# Patient Record
Sex: FEMALE | Race: WHITE
[De-identification: names, ages, dates, MRNs, and addresses within clinical notes are randomized per-mention and may not be internally consistent; named-entity substitution may affect disease eponyms.]

---

## 2018-10-10 ENCOUNTER — HOSPITAL ENCOUNTER (INPATIENT)
Dept: HOSPITAL 89 - OR | Age: 38
LOS: 4 days | Discharge: HOME | DRG: 340 | End: 2018-10-14
Attending: SURGERY | Admitting: SURGERY
Payer: COMMERCIAL

## 2018-10-10 ENCOUNTER — HOSPITAL ENCOUNTER (OUTPATIENT)
Dept: HOSPITAL 89 - CT | Age: 38
End: 2018-10-10
Attending: PHYSICIAN ASSISTANT
Payer: COMMERCIAL

## 2018-10-10 VITALS — DIASTOLIC BLOOD PRESSURE: 76 MMHG | SYSTOLIC BLOOD PRESSURE: 112 MMHG

## 2018-10-10 VITALS — SYSTOLIC BLOOD PRESSURE: 149 MMHG | DIASTOLIC BLOOD PRESSURE: 78 MMHG

## 2018-10-10 VITALS — DIASTOLIC BLOOD PRESSURE: 78 MMHG | SYSTOLIC BLOOD PRESSURE: 121 MMHG

## 2018-10-10 VITALS — DIASTOLIC BLOOD PRESSURE: 76 MMHG | SYSTOLIC BLOOD PRESSURE: 118 MMHG

## 2018-10-10 VITALS — DIASTOLIC BLOOD PRESSURE: 76 MMHG | SYSTOLIC BLOOD PRESSURE: 121 MMHG

## 2018-10-10 VITALS
WEIGHT: 243 LBS | WEIGHT: 243 LBS | HEIGHT: 69 IN | HEIGHT: 69 IN | BODY MASS INDEX: 35.99 KG/M2 | BODY MASS INDEX: 35.99 KG/M2

## 2018-10-10 VITALS — SYSTOLIC BLOOD PRESSURE: 123 MMHG | DIASTOLIC BLOOD PRESSURE: 76 MMHG

## 2018-10-10 VITALS — DIASTOLIC BLOOD PRESSURE: 74 MMHG | SYSTOLIC BLOOD PRESSURE: 126 MMHG

## 2018-10-10 VITALS — DIASTOLIC BLOOD PRESSURE: 64 MMHG | SYSTOLIC BLOOD PRESSURE: 114 MMHG

## 2018-10-10 VITALS — SYSTOLIC BLOOD PRESSURE: 121 MMHG | DIASTOLIC BLOOD PRESSURE: 72 MMHG

## 2018-10-10 VITALS — DIASTOLIC BLOOD PRESSURE: 73 MMHG | SYSTOLIC BLOOD PRESSURE: 121 MMHG

## 2018-10-10 DIAGNOSIS — E66.9: ICD-10-CM

## 2018-10-10 DIAGNOSIS — N83.201: ICD-10-CM

## 2018-10-10 DIAGNOSIS — N83.202: ICD-10-CM

## 2018-10-10 DIAGNOSIS — K35.80: Primary | ICD-10-CM

## 2018-10-10 DIAGNOSIS — K57.30: ICD-10-CM

## 2018-10-10 DIAGNOSIS — K35.32: Primary | ICD-10-CM

## 2018-10-10 DIAGNOSIS — F41.8: ICD-10-CM

## 2018-10-10 PROCEDURE — 74177 CT ABD & PELVIS W/CONTRAST: CPT

## 2018-10-10 PROCEDURE — 84295 ASSAY OF SERUM SODIUM: CPT

## 2018-10-10 PROCEDURE — 82565 ASSAY OF CREATININE: CPT

## 2018-10-10 PROCEDURE — 81025 URINE PREGNANCY TEST: CPT

## 2018-10-10 PROCEDURE — 0DTJ0ZZ RESECTION OF APPENDIX, OPEN APPROACH: ICD-10-PCS | Performed by: SURGERY

## 2018-10-10 PROCEDURE — 86140 C-REACTIVE PROTEIN: CPT

## 2018-10-10 PROCEDURE — 82947 ASSAY GLUCOSE BLOOD QUANT: CPT

## 2018-10-10 PROCEDURE — 82435 ASSAY OF BLOOD CHLORIDE: CPT

## 2018-10-10 PROCEDURE — 88304 TISSUE EXAM BY PATHOLOGIST: CPT

## 2018-10-10 PROCEDURE — 85025 COMPLETE CBC W/AUTO DIFF WBC: CPT

## 2018-10-10 PROCEDURE — 82374 ASSAY BLOOD CARBON DIOXIDE: CPT

## 2018-10-10 PROCEDURE — 84132 ASSAY OF SERUM POTASSIUM: CPT

## 2018-10-10 PROCEDURE — 84520 ASSAY OF UREA NITROGEN: CPT

## 2018-10-10 PROCEDURE — 36415 COLL VENOUS BLD VENIPUNCTURE: CPT

## 2018-10-10 PROCEDURE — 82310 ASSAY OF CALCIUM: CPT

## 2018-10-10 RX ADMIN — DOCUSATE SODIUM SCH MG: 100 CAPSULE, LIQUID FILLED ORAL at 20:57

## 2018-10-10 RX ADMIN — HYDROCODONE BITARTRATE AND ACETAMINOPHEN PRN EACH: 5; 325 TABLET ORAL at 21:05

## 2018-10-10 RX ADMIN — DEXTROSE MONOHYDRATE, SODIUM CHLORIDE, AND POTASSIUM CHLORIDE PRN MLS/HR: 50; 4.5; 1.49 INJECTION, SOLUTION INTRAVENOUS at 20:59

## 2018-10-10 RX ADMIN — PIPERACILLIN AND TAZOBACTAM SCH MLS/HR: 3; .375 INJECTION, POWDER, LYOPHILIZED, FOR SOLUTION INTRAVENOUS; PARENTERAL at 23:16

## 2018-10-10 NOTE — POST OPERATIVE PROGRESS NOTE
Post Operative Progress Note


Date:  Oct 10, 2018


Time:  19:02


Surgeon:  


Ethan Pritchett MD


Assistant:  


none


Anesthesia:  


Dr Anahi HERNANDEZ


Pre-Op Diagnosis:  


acute appendicitis


Post-Op Diagnosis:  


same, contained perforation


Findings:  


gross fecal contamination RLQ


Procedure(s):  


laparoscopic appendectomy, full note dictated #[id number]


Specimen Removed:(May be N/A):  


appendix


Complications:  


none


Total Tourniquet Time:  


NA


Splint:  


NA


Fluids:  


900 ml


Estimated Blood Loss:  


< 5 ml


Date OP Note Dictated:  Oct 10, 2018


Time OP Note Dictated:  18:58











ETHAN PRITCHETT MD               Oct 10, 2018 19:06

## 2018-10-10 NOTE — GEN SURGERY HISTORY & PHYSICAL
History of Present Illness


Chief Complaint


RLQ abdominal pain


History of Present Illness


37 yo female with acute onset severe, constant RLQ abdominal pain.  + NV.  Seen 


by PCP today, with elevated WBC, sent for CT scan which was consistent with 


acute appendicitis.  No prior similar s/s, no hx IBD.





History


Unable To Obtain Past Medical:  PCP notes reviewed





Review of Systems


Constitutional:  Fever


Gastrointestinal:  Other (see HPI)


Psychiatric:  Anxiety





Exam


General Appearance:  Alert, Awake, No Acute Distress, Afebrile


Neuro:  No Gross deficits


Cardiovascular:  Normal Rhythm & Peripheral Pulses


Respiratory:  No Respiratory Distress, Clear to Auscultation


GI:  Other (soft, obese, tender RLQ with + peritoneal s/s)


Musculoskeletal:  No Weakness/Pain


Integumentary:  Skin Intact without Lesion / Mass


Psych:  Alert & Oriented X3, Appropriate Mood & Affect





Medical Decision Making


EKG / Imaging


Monitor Interpretation:  Normal Sinus Rhythm





Assessment and Plan


Problems:  


(1) Appendicitis


Status:  Acute


Assessment & Plan:  Pt recommended to undergo laparoscopic appendectomy. Proc, 


risks, benefits discussed with pt.  She understands risk of bleeding, leak, 


abscess, damage to viscera, need for secondary intervention.  Informed consent 


signed.





Time Spent:  > 30 min





Venous Thromboembolism


VTE Risk


Physician Assess for VTE Risk:  Yes


Patient's VTE Risk:  Low





VTE Diagnostic Test


2 Days Prior to Admit:  No





Antithrombotics


Is Pt On Any Antithrombotics?:  No





Prophylaxis Tx Contraindicated


Pharmacological Contraindicati:  Pt at Low Risk for VTE


Mechanical Contraindications:  Pt at Low Risk for VTE











ETHAN RANGEL MD               Oct 10, 2018 17:22

## 2018-10-10 NOTE — RADIOLOGY IMAGING REPORT
FACILITY: [facility] [place] 

 

PATIENT NAME: Nina Chiang

: 1980

MR: [id number]

V: [id number]

EXAM DATE: 

ORDERING PHYSICIAN: JANA PAVON

TECHNOLOGIST: 

 

Location: [facility] [place]

Patient: Nina Chiang

: 1980

MRN: [id number]

Visit/Account:[id number]

Date of Sevice: 10/10/2018

 

ACCESSION #: [id number]

 

ABDOMEN/PELVIS WITH CONTRAST

 

HISTORY:  Right lower quadrant pain

 

TECHNIQUE: Following administration of IV contrast contiguous axial images acquired through the abdom
en/pelvis.  Coronal and sagittal reformatting also performed. Dose Lowering Technique

 

One of the following dose optimization techniques was utilized in the performance of this exam: Autom
ated exposure control; adjustment of the mA and/or kV to the patient's size; or use of an iterative  
reconstruction technique.  Specific details can be referenced in the facility's radiology CT exam ope
rational policy.

 

 

 

CONTRAST:  75 mL Isovue-370

 

COMPARISON:  None.

 

FINDINGS:

 

Visualized lung bases:  Negative.

 

Hepatobiliary:  Negative.

 

Spleen:  Negative.

 

Adrenals:  Negative.

 

Pancreas:  Negative.

 

Kidneys ureters or bladder: Negative.

 

Genitalia:  Bilateral ovarian cysts.  The largest on the right measures 3 cm in diameter the largest 
on the left measures 1.9 cm in diameter

 

GI:  The appendix is very dilated, fluid-filled and measuring up to 1.5 cm in diameter with  enhancem
ent of the appendiceal wall.  There are marked infiltrative changes seen in the periappendiceal fat a
nd adjacent free fluid small amount extends into the pelvis.  A periappendiceal abscess is not apprec
iated.  There is however thickening of the wall of the adjacent cecum.

 

 Incidentally noted is mild diverticulosis of the sigmoid colon although no CT evidence of acute dive
rticulitis

 

Vessels/spaces/nodes:  There are several small mesenteric lymph nodes in the right lower quadrant

 

Bones/soft tissues:  Incidentally noted are moderate spondylotic changes at L4-5 and L5-S1

 

Additional findings:  None pertinent.

 

IMPRESSION:

 

Findings are consistent with acute appendicitis with a very dilated fluid-filled appendix measuring u
p to 1.5 cm in diameter with enhancement of the appendiceal wall.  There are marked infiltrative pretty
ges seen in the periappendiceal fat with adjacent free fluid extending into the pelvis.  Is also thic
kening of the wall of the adjacent cecum although a discrete periappendiceal abscess is not seen.

 

 

Incidental note of mild diverticulosis of the sigmoid colon

 

Bilateral ovarian cysts measuring 3 cm on the right and 1.9 cm on the left

 

 

 

Results were called to JANA PAVON at 10/10/2018 4:40 PM.

 

Report Dictated By: Aida Jenkins MD at 10/10/2018 4:41 PM

 

Report E-Signed By: Aida Jenkins MD  at 10/10/2018 4:48 PM

 

WSN:[id number]

## 2018-10-11 VITALS — SYSTOLIC BLOOD PRESSURE: 117 MMHG | DIASTOLIC BLOOD PRESSURE: 72 MMHG

## 2018-10-11 VITALS — DIASTOLIC BLOOD PRESSURE: 66 MMHG | SYSTOLIC BLOOD PRESSURE: 110 MMHG

## 2018-10-11 VITALS — DIASTOLIC BLOOD PRESSURE: 65 MMHG | SYSTOLIC BLOOD PRESSURE: 114 MMHG

## 2018-10-11 VITALS — SYSTOLIC BLOOD PRESSURE: 96 MMHG | DIASTOLIC BLOOD PRESSURE: 54 MMHG

## 2018-10-11 VITALS — DIASTOLIC BLOOD PRESSURE: 65 MMHG | SYSTOLIC BLOOD PRESSURE: 108 MMHG

## 2018-10-11 VITALS — DIASTOLIC BLOOD PRESSURE: 64 MMHG | SYSTOLIC BLOOD PRESSURE: 118 MMHG

## 2018-10-11 VITALS — DIASTOLIC BLOOD PRESSURE: 64 MMHG | SYSTOLIC BLOOD PRESSURE: 111 MMHG

## 2018-10-11 VITALS — DIASTOLIC BLOOD PRESSURE: 51 MMHG | SYSTOLIC BLOOD PRESSURE: 97 MMHG

## 2018-10-11 LAB — PLATELET COUNT, AUTOMATED: 217 K/UL (ref 150–450)

## 2018-10-11 RX ADMIN — DEXTROSE MONOHYDRATE, SODIUM CHLORIDE, AND POTASSIUM CHLORIDE PRN MLS/HR: 50; 4.5; 1.49 INJECTION, SOLUTION INTRAVENOUS at 06:34

## 2018-10-11 RX ADMIN — PIPERACILLIN AND TAZOBACTAM SCH MLS/HR: 3; .375 INJECTION, POWDER, LYOPHILIZED, FOR SOLUTION INTRAVENOUS; PARENTERAL at 23:39

## 2018-10-11 RX ADMIN — PIPERACILLIN AND TAZOBACTAM SCH MLS/HR: 3; .375 INJECTION, POWDER, LYOPHILIZED, FOR SOLUTION INTRAVENOUS; PARENTERAL at 13:18

## 2018-10-11 RX ADMIN — HYDROCODONE BITARTRATE AND ACETAMINOPHEN PRN EACH: 5; 325 TABLET ORAL at 18:08

## 2018-10-11 RX ADMIN — PIPERACILLIN AND TAZOBACTAM SCH MLS/HR: 3; .375 INJECTION, POWDER, LYOPHILIZED, FOR SOLUTION INTRAVENOUS; PARENTERAL at 05:30

## 2018-10-11 RX ADMIN — HYDROCODONE BITARTRATE AND ACETAMINOPHEN PRN EACH: 5; 325 TABLET ORAL at 22:23

## 2018-10-11 RX ADMIN — PIPERACILLIN AND TAZOBACTAM SCH MLS/HR: 3; .375 INJECTION, POWDER, LYOPHILIZED, FOR SOLUTION INTRAVENOUS; PARENTERAL at 18:08

## 2018-10-11 RX ADMIN — DOCUSATE SODIUM SCH MG: 100 CAPSULE, LIQUID FILLED ORAL at 21:30

## 2018-10-11 RX ADMIN — HYDROCODONE BITARTRATE AND ACETAMINOPHEN PRN EACH: 5; 325 TABLET ORAL at 01:16

## 2018-10-11 RX ADMIN — HYDROCODONE BITARTRATE AND ACETAMINOPHEN PRN EACH: 5; 325 TABLET ORAL at 08:07

## 2018-10-11 RX ADMIN — ESCITALOPRAM OXALATE SCH MG: 10 TABLET, FILM COATED ORAL at 21:26

## 2018-10-11 RX ADMIN — DOCUSATE SODIUM SCH MG: 100 CAPSULE, LIQUID FILLED ORAL at 08:07

## 2018-10-11 RX ADMIN — ENOXAPARIN SODIUM SCH MG: 100 INJECTION SUBCUTANEOUS at 21:27

## 2018-10-11 NOTE — GENERAL SURGERY PROGRESS NOTE
Subjective


Progress Notes


Subjective


c/o incisional pain, no NV





Physical Exam





Vital Signs








  Date Time  Temp Pulse Resp B/P (MAP) Pulse Ox O2 Delivery O2 Flow Rate FiO2


 


10/11/18 08:03 98.0 63 16 117/72 (87) 93 Room Air  


 


10/11/18 05:36       1.0 














Intake and Output 


 


 10/11/18





 06:59


 


Intake Total 2300 ml


 


Output Total 210 ml


 


Balance 2090 ml


 


 


 


Intake IV Total 2300 ml


 


Output Drainage Total 120 ml


 


Other 90 ml








General Appearance:  Alert, Awake, No Acute Distress, Afebrile


Neuro:  No Gross deficits


Cardiovascular:  Normal Rhythm & Peripheral Pulses, Regular Rate and Rhythm


Respiratory:  No Respiratory Distress, Clear to Auscultation


GI:  Soft and Non-Tender, Other (MIGDALIA serosang)


Musculoskeletal:  No Weakness/Pain


Integumentary:  Skin Intact without Lesion / Mass


Psych:  Alert & Oriented X3, Appropriate Mood & Affect


Result Diagram:  


10/11/18 0533                                                                   


            10/11/18 0533





Monitor Interpretation:  Normal Sinus Rhythm





Assessment and Plan


Problems:  


(1) Appendicitis


Status:  Acute


Assessment & Plan:  Pt recommended to undergo laparoscopic appendectomy. Proc, 


risks, benefits discussed with pt.  She understands risk of bleeding, leak, 


abscess, damage to viscera, need for secondary intervention.  Informed consent 


signed.





10/11/2018: stable progress, cont Zosyn.  Cont full liquids. Pulm toilet and 


mobilize OOB.  Cont MIGDALIA.





Time Spent:  > 30 min





Exam


Sepsis Risk:  No Definite Risk











ETHAN RANGEL MD               Oct 11, 2018 08:12

## 2018-10-11 NOTE — OPERATIVE REPORT 1
EVENT DATE:  October 10, 2018 

SURGEON:  Jayla Pritchett MD

ANESTHESIOLOGIST:  Krishna Christian MD 

ANESTHESIA:  General endotracheal anesthesia.





PREOPERATIVE DIAGNOSIS  

Acute appendicitis. 



POSTOPERATIVE DIAGNOSIS 

Acute appendicitis, perforated appendicitis.



PROCEDURE PERFORMED 

Laparoscopic appendectomy.



WOUND CLASS

4 



INDICATIONS FOR OPERATION 

This patient is a 38-year-old female with peritoneal signs localized in the 

right lower quadrant with markedly abnormal appendix on CT scan.  



FINDINGS AT TIME OF OPERATION

The patient had acute perforated appendicitis with gross fecal contamination in 

the right lower quadrant.  This was completely debrided and irrigated until the 

effluent was clear.  



DETAILS OF THE OPERATION

On October 10, 2018, patient was brought to the operating room and placed in the

supine position.  Appropriate lines and monitors were placed.  Patient was 

induced and intubated under general anesthesia without difficulty.  She was then

prepped and draped in the usual sterile manner.  Pneumoperitoneum was 

established at the infraumbilical position using standard open technique.  Skin,

subcutaneous, and fascial tissues were opened under direct vision using sharp 

dissection as well as electrocautery.  The peritoneal cavity was entered, and 

the Karina trocar was inserted and held in place with two stay sutures of #1 

Vicryl.  Under direct vision, two additional trocars were placed, a 5 mm along 

the lower midline and another 12 mm in the right lower quadrant.  The turbid 

fluid in the pelvis was irrigated free, and the appendix was identified in the 

right lower quadrant.  The findings are as stated above.  The mesoappendix was 

opened at the base of the appendix, and the mesoappendix was then divided with 

the laparoscopic stapler.  The appendix was then divided with the bowel load of 

the laparoscopic stapler. The appendix was placed in a pouch and removed from 

the abdominal cavity.  The operative field was hemostatic.  This was then 

irrigated copiously with 1 L of sterile saline, and no further gross fibrinous 

or fecal contamination was identified.  A 19 mm Karan-Leslie drain was then 

placed through the lower trocar site and along the operative field.  The fascia 

at the right lower quadrant was closed with the Haris-Dean device.  All 

trocars were removed under direct vision, and the sites were hemostatic.  

Pneumoperitoneum was evacuated.  Fascia closed at the infraumbilical level with 

the two stay sutures.  Wounds were infiltrated with a total of 40 mL of 0.25% 

Marcaine without epinephrine.  Wounds were then dressed with Mastisol, Steri-

Strips, and dry, sterile gauze.  Patient was then extubated and taken to 

recovery room in stable condition.  She tolerated the procedure well.  Estimated

blood loss was minimal.  Final sponge and needle counts were correct times two. 

MTDD

## 2018-10-12 VITALS — SYSTOLIC BLOOD PRESSURE: 102 MMHG | DIASTOLIC BLOOD PRESSURE: 63 MMHG

## 2018-10-12 VITALS — SYSTOLIC BLOOD PRESSURE: 102 MMHG | DIASTOLIC BLOOD PRESSURE: 60 MMHG

## 2018-10-12 VITALS — SYSTOLIC BLOOD PRESSURE: 112 MMHG | DIASTOLIC BLOOD PRESSURE: 67 MMHG

## 2018-10-12 VITALS — DIASTOLIC BLOOD PRESSURE: 54 MMHG | SYSTOLIC BLOOD PRESSURE: 107 MMHG

## 2018-10-12 VITALS — DIASTOLIC BLOOD PRESSURE: 63 MMHG | SYSTOLIC BLOOD PRESSURE: 107 MMHG

## 2018-10-12 LAB — PLATELET COUNT, AUTOMATED: 196 K/UL (ref 150–450)

## 2018-10-12 RX ADMIN — PIPERACILLIN AND TAZOBACTAM SCH MLS/HR: 3; .375 INJECTION, POWDER, LYOPHILIZED, FOR SOLUTION INTRAVENOUS; PARENTERAL at 05:39

## 2018-10-12 RX ADMIN — ESCITALOPRAM OXALATE SCH MG: 10 TABLET, FILM COATED ORAL at 20:29

## 2018-10-12 RX ADMIN — ENOXAPARIN SODIUM SCH MG: 100 INJECTION SUBCUTANEOUS at 20:29

## 2018-10-12 RX ADMIN — PIPERACILLIN AND TAZOBACTAM SCH MLS/HR: 3; .375 INJECTION, POWDER, LYOPHILIZED, FOR SOLUTION INTRAVENOUS; PARENTERAL at 12:19

## 2018-10-12 RX ADMIN — PIPERACILLIN AND TAZOBACTAM SCH MLS/HR: 3; .375 INJECTION, POWDER, LYOPHILIZED, FOR SOLUTION INTRAVENOUS; PARENTERAL at 18:11

## 2018-10-12 RX ADMIN — DOCUSATE SODIUM SCH MG: 100 CAPSULE, LIQUID FILLED ORAL at 20:29

## 2018-10-12 RX ADMIN — HYDROCODONE BITARTRATE AND ACETAMINOPHEN PRN EACH: 5; 325 TABLET ORAL at 10:24

## 2018-10-12 RX ADMIN — HYDROCODONE BITARTRATE AND ACETAMINOPHEN PRN EACH: 5; 325 TABLET ORAL at 05:39

## 2018-10-12 RX ADMIN — DOCUSATE SODIUM SCH MG: 100 CAPSULE, LIQUID FILLED ORAL at 09:14

## 2018-10-12 NOTE — GENERAL SURGERY PROGRESS NOTE
Subjective


Progress Notes


Subjective


feels a lil better, up in halls without difficulty, appetite still diminished.  


No NV, + flatus





Physical Exam





Vital Signs








  Date Time  Temp Pulse Resp B/P (MAP) Pulse Ox O2 Delivery O2 Flow Rate FiO2


 


10/12/18 05:42 98.0 74 18 107/63 (78) 95 Nasal Cannula 1.0 














Intake and Output 


 


 10/12/18





 07:00


 


Intake Total 2574 ml


 


Output Total 90 ml


 


Balance 2484 ml


 


 


 


Intake Oral 1270 ml


 


IV Total 1304 ml


 


Output Drainage Total 90 ml


 


# Voids 3








General Appearance:  Alert, Awake, No Acute Distress, Afebrile


Neuro:  No Gross deficits


Cardiovascular:  Normal Rhythm & Peripheral Pulses


Respiratory:  No Respiratory Distress, Clear to Auscultation


GI:  Soft and Non-Tender, Other (incisions CDI, MIGDALIA serosang)


Musculoskeletal:  No Weakness/Pain


Extremities:  Soft and Non Tender, Warm, Pulses, Perfused, Other (no CCE)


Integumentary:  Skin Intact without Lesion / Mass


Psych:  Alert & Oriented X3, Appropriate Mood & Affect


Result Diagram:  


10/12/18 0537                                                                   


            10/11/18 0533





Monitor Interpretation:  Normal Sinus Rhythm





Assessment and Plan


Problems:  


(1) Appendicitis


Status:  Acute


Assessment & Plan:  Pt recommended to undergo laparoscopic appendectomy. Proc, 


risks, benefits discussed with pt.  She understands risk of bleeding, leak, 


abscess, damage to viscera, need for secondary intervention.  Informed consent 


signed.





10/11/2018: POD#1 stable progress, cont Zosyn.  Cont full liquids. Pulm toilet 


and mobilize OOB.  Cont MIGDALIA.





10/12/2018: POD#2  Stable.Cont Zosyn.  Adv to full liq slow as manoj.  DC MIGDALIA given


low output.  WBC improved.





Time Spent:  > 30 min





Exam


Sepsis Risk:  No Definite Risk





Problem Qualifiers





(1) Appendicitis:  


Appendicitis type:  acute appendicitis  Acute appendicitis type:  with localized


peritonitis  Appendicitis perforation presence:  with perforation  Appendicitis 


abscess presence:  without abscess








ETHAN RANGEL MD               Oct 12, 2018 07:41

## 2018-10-13 VITALS — DIASTOLIC BLOOD PRESSURE: 70 MMHG | SYSTOLIC BLOOD PRESSURE: 142 MMHG

## 2018-10-13 VITALS — SYSTOLIC BLOOD PRESSURE: 135 MMHG | DIASTOLIC BLOOD PRESSURE: 77 MMHG

## 2018-10-13 VITALS — DIASTOLIC BLOOD PRESSURE: 76 MMHG | SYSTOLIC BLOOD PRESSURE: 120 MMHG

## 2018-10-13 VITALS — SYSTOLIC BLOOD PRESSURE: 125 MMHG | DIASTOLIC BLOOD PRESSURE: 77 MMHG

## 2018-10-13 VITALS — DIASTOLIC BLOOD PRESSURE: 82 MMHG | SYSTOLIC BLOOD PRESSURE: 124 MMHG

## 2018-10-13 VITALS — DIASTOLIC BLOOD PRESSURE: 69 MMHG | SYSTOLIC BLOOD PRESSURE: 123 MMHG

## 2018-10-13 VITALS — DIASTOLIC BLOOD PRESSURE: 67 MMHG | SYSTOLIC BLOOD PRESSURE: 123 MMHG

## 2018-10-13 VITALS — SYSTOLIC BLOOD PRESSURE: 107 MMHG | DIASTOLIC BLOOD PRESSURE: 60 MMHG

## 2018-10-13 LAB — PLATELET COUNT, AUTOMATED: 208 K/UL (ref 150–450)

## 2018-10-13 RX ADMIN — PIPERACILLIN AND TAZOBACTAM SCH MLS/HR: 3; .375 INJECTION, POWDER, LYOPHILIZED, FOR SOLUTION INTRAVENOUS; PARENTERAL at 06:01

## 2018-10-13 RX ADMIN — ESCITALOPRAM OXALATE SCH MG: 10 TABLET, FILM COATED ORAL at 20:22

## 2018-10-13 RX ADMIN — DOCUSATE SODIUM SCH MG: 100 CAPSULE, LIQUID FILLED ORAL at 20:22

## 2018-10-13 RX ADMIN — DOCUSATE SODIUM SCH MG: 100 CAPSULE, LIQUID FILLED ORAL at 09:15

## 2018-10-13 RX ADMIN — AMOXICILLIN AND CLAVULANATE POTASSIUM SCH MG: 875; 125 TABLET, FILM COATED ORAL at 17:01

## 2018-10-13 RX ADMIN — ENOXAPARIN SODIUM SCH MG: 100 INJECTION SUBCUTANEOUS at 20:22

## 2018-10-13 RX ADMIN — PIPERACILLIN AND TAZOBACTAM SCH MLS/HR: 3; .375 INJECTION, POWDER, LYOPHILIZED, FOR SOLUTION INTRAVENOUS; PARENTERAL at 00:18

## 2018-10-13 RX ADMIN — AMOXICILLIN AND CLAVULANATE POTASSIUM SCH MG: 875; 125 TABLET, FILM COATED ORAL at 10:06

## 2018-10-13 NOTE — ANTIMICROBIAL STEWARDSHIP
Antimicrobial Time Out


Antimicrobial Stewardship


MD Service:  Other (GENERAL SURGERY)


Indications:  Other (POST OP PROPHYLAXIS / APPENDICITIS)


Antimicrobial Used


AUGMENTIN 875MG PO BID


Culture Results:  N/A





Eligible for PO Conversion


Eligable for PO Conversion:  Yes (CONVERTED TO PO TODAY 10/13)





Reviewed with Provider


Reviewed w/ Provider on Rounds:  No











REGINALDO LANGLEY                  Oct 13, 2018 15:54

## 2018-10-13 NOTE — GENERAL SURGERY PROGRESS NOTE
Subjective


Patient Complains of:


Gastrointestinal:  Other (gurgling and rumbling in stomach)


Musculoskeletal:  Pain (mild pain at incision sites when moving)





Physical Exam





Vital Signs








  Date Time  Temp Pulse Resp B/P (MAP) Pulse Ox O2 Delivery O2 Flow Rate FiO2


 


10/13/18 08:14 98.9 77 16 124/82 (96) 94 Nasal Cannula 0.5 














Intake and Output 


 


 10/13/18





 07:00


 


Intake Total 970 ml


 


Output Total 30 ml


 


Balance 940 ml


 


 


 


Intake Oral 120 ml


 


IV Total 850 ml


 


Output Drainage Total 30 ml


 


# Voids 2








General Appearance:  Alert, Awake


Cardiovascular:  Regular Rate and Rhythm


Respiratory:  Clear to Auscultation, Other (requiring supplemental oxygen when 


dozes off)


GI:  Soft and Non-Tender, Other (except at incisions, active bowel sounds, 


flatus but no bowel movement)


Extremities:  Soft and Non Tender, Warm


Result Diagram:  


10/13/18 0608                                                                   


            10/11/18 0533





Monitor Interpretation:  Normal Sinus Rhythm





Assessment and Plan


Problems:  


(1) Appendicitis


Status:  Acute


Assessment & Plan:  Pt recommended to undergo laparoscopic appendectomy. Proc, 


risks, benefits discussed with pt.  She understands risk of bleeding, leak, 


abscess, damage to viscera, need for secondary intervention.  Informed consent 


signed.





10/11/2018: POD#1 stable progress, cont Zosyn.  Cont full liquids. Pulm toilet 


and mobilize OOB.  Cont MIGDALIA.





10/12/2018: POD#2  Stable.Cont Zosyn.  Adv to full liq slow as manoj.  DC MIGDALIA given


low output.  WBC improved.





10/13/2018  9am:  POD#3  Continue to look good.  WBCs normal and afebrile.  


Convert to oral antibiotics and probably home tomorrow with 7 day (total) course


of antibiotics.  Pathology noted.  Also note elevated glucose.








Exam


Sepsis Risk:  No Definite Risk





Problem Qualifiers





(1) Appendicitis:  


Appendicitis type:  acute appendicitis  Acute appendicitis type:  with localized


peritonitis  Appendicitis perforation presence:  with perforation  Appendicitis 


abscess presence:  without abscess








LESTER SAENZ MD             Oct 13, 2018 09:01

## 2018-10-14 VITALS — SYSTOLIC BLOOD PRESSURE: 113 MMHG | DIASTOLIC BLOOD PRESSURE: 63 MMHG

## 2018-10-14 VITALS — SYSTOLIC BLOOD PRESSURE: 107 MMHG | DIASTOLIC BLOOD PRESSURE: 71 MMHG

## 2018-10-14 RX ADMIN — AMOXICILLIN AND CLAVULANATE POTASSIUM SCH MG: 875; 125 TABLET, FILM COATED ORAL at 08:11

## 2018-10-14 RX ADMIN — DOCUSATE SODIUM SCH MG: 100 CAPSULE, LIQUID FILLED ORAL at 08:12

## 2018-10-14 NOTE — GENERAL SURGERY PROGRESS NOTE
Subjective


Progress Notes


Subjective


"I feel MUCH better this morning!"





Physical Exam





Vital Signs








  Date Time  Temp Pulse Resp B/P (MAP) Pulse Ox O2 Delivery O2 Flow Rate FiO2


 


10/14/18 07:28 98.3 66 14 113/63 (80) 96 Nasal Cannula 0.5 














Intake and Output 


 


 10/14/18





 07:00


 


Intake Total 618 ml


 


Output Total 30 ml


 


Balance 588 ml


 


 


 


Intake Oral 618 ml


 


Output Drainage Total 30 ml


 


# Voids 2








General Appearance:  Alert, Awake, No Acute Distress


Cardiovascular:  Regular Rate and Rhythm


Respiratory:  Clear to Auscultation


GI:  Soft and Non-Tender, Other (incisions examined and are clean, dry and 


without cellulitis.)


Extremities:  Soft and Non Tender, Warm, Other (without edema)


Result Diagram:  


10/13/18 0608                                                                   


            10/11/18 0533





Monitor Interpretation:  Normal Sinus Rhythm





Assessment and Plan


Problems:  


(1) Appendicitis


Status:  Acute


Assessment & Plan:  Pt recommended to undergo laparoscopic appendectomy. Proc, 


risks, benefits discussed with pt.  She understands risk of bleeding, leak, 


abscess, damage to viscera, need for secondary intervention.  Informed consent 


signed.





10/11/2018: POD#1 stable progress, cont Zosyn.  Cont full liquids. Pulm toilet 


and mobilize OOB.  Cont MIGDALIA.





10/12/2018: POD#2  Stable.Cont Zosyn.  Adv to full liq slow as manoj.  DC MIGDALIA given


low output.  WBC improved.





10/13/2018  9am:  POD#3  Continue to look good.  WBCs normal and afebrile.  


Convert to oral antibiotics and probably home tomorrow with 7 day (total) course


of antibiotics.  Pathology noted.  Also note elevated glucose.





10/14/2018 10:40am  POD#4:  Remains afebrile and feels much better than when I 


saw her yesterday afternoon.  tolerating oral antibiotics well which we will 


continue for a total of 7 days.  Discharge instructions given to the patient 


including diet (regular), pain control (Norco but convert to anti-inflammatories


or tylenol alone as soon as possible), mobility and activity ( no lifting >20# 


until 2 weeks after surgery), follow up (Dr. J. Ullrich 7-10 days after surgery,


please call for appointment).  Patient may shower and anticipate discharge this 


afternoon.








Exam


Sepsis Risk:  No Definite Risk





Problem Qualifiers





(1) Appendicitis:  


Appendicitis type:  acute appendicitis  Acute appendicitis type:  with localized


peritonitis  Appendicitis perforation presence:  with perforation  Appendicitis 


abscess presence:  without abscess








LESTER SAENZ MD             Oct 14, 2018 10:43